# Patient Record
(demographics unavailable — no encounter records)

---

## 2024-12-10 NOTE — PHYSICAL EXAM
[de-identified] : short, thick neck, no cervical or supraclavicular adenopathy, trachea midline, thyroid without enlargement or palpable mass [Normal] : no neck adenopathy [de-identified] : Skin:  normal appearance.  no rash, nodules, vesicles, or erythema,\par  Musculoskeletal:  full range of motion and no deformities appreciated\par  Neurological:  grossly intact\par  Psychiatric:  oriented to person, place and time with appropriate affect

## 2024-12-10 NOTE — ASSESSMENT
[FreeTextEntry1] : Patient with 3-year hx of MNG with prior benign biopsy.  minimal change on prior US.  request f/u US now once vertigo imprtoves. patient will contact office to review.  if stable will obtain f/u US prior to visit in 1 year.  I have answered their questions to the best of my ability.

## 2024-12-10 NOTE — HISTORY OF PRESENT ILLNESS
[de-identified] : Patient referred by Dr. Bhagat, for evaluation of multinodular goiter. Patient has a long history of neck thickness and has had prior ultrasounds. Reports history of cervical disc disease, recent MRI revealed thyroid nodule. Thyroid ultrasound, July 2021: Right lobe 5.5 x 2.0 x 2.9 CM with 10 mm upper and 2.3 x 1.8 x 2.7 CM MID nodule. Left lobe 4.2 x 1.2 x 1.5 CM with lower 6 mm nodule. No enlarged lymph nodes. Patient denies prior history of thyroid disease, dysphagia, or radiation exposure. She reports occasional raspy voice. US guided FNA right nodule 7/2021 benign.  PCP wali Carver.  christa 2/2022 with normal TFTs .  2/2024 US no significant change.  now on ozempic. Has not had US due to vertigo at two appointments.   I have reviewed all old and new data and available images.